# Patient Record
Sex: MALE | Race: WHITE | URBAN - METROPOLITAN AREA
[De-identification: names, ages, dates, MRNs, and addresses within clinical notes are randomized per-mention and may not be internally consistent; named-entity substitution may affect disease eponyms.]

---

## 2024-11-01 ASSESSMENT — PATIENT HEALTH QUESTIONNAIRE - PHQ9
SUM OF ALL RESPONSES TO PHQ QUESTIONS 1-9: 0
2. FEELING DOWN, DEPRESSED OR HOPELESS: NOT AT ALL
SUM OF ALL RESPONSES TO PHQ QUESTIONS 1-9: 0
SUM OF ALL RESPONSES TO PHQ QUESTIONS 1-9: 0
1. LITTLE INTEREST OR PLEASURE IN DOING THINGS: NOT AT ALL
SUM OF ALL RESPONSES TO PHQ9 QUESTIONS 1 & 2: 0
2. FEELING DOWN, DEPRESSED OR HOPELESS: NOT AT ALL
1. LITTLE INTEREST OR PLEASURE IN DOING THINGS: NOT AT ALL
SUM OF ALL RESPONSES TO PHQ QUESTIONS 1-9: 0
SUM OF ALL RESPONSES TO PHQ9 QUESTIONS 1 & 2: 0

## 2024-11-04 ENCOUNTER — OFFICE VISIT (OUTPATIENT)
Dept: FAMILY MEDICINE CLINIC | Age: 29
End: 2024-11-04

## 2024-11-04 VITALS
RESPIRATION RATE: 18 BRPM | OXYGEN SATURATION: 96 % | HEART RATE: 69 BPM | WEIGHT: 193.6 LBS | HEIGHT: 72 IN | DIASTOLIC BLOOD PRESSURE: 64 MMHG | TEMPERATURE: 97.9 F | SYSTOLIC BLOOD PRESSURE: 106 MMHG | BODY MASS INDEX: 26.22 KG/M2

## 2024-11-04 DIAGNOSIS — M25.562 ACUTE PAIN OF LEFT KNEE: ICD-10-CM

## 2024-11-04 DIAGNOSIS — Z31.69 SUBFERTILITY OF COUPLE: ICD-10-CM

## 2024-11-04 DIAGNOSIS — Z00.00 WELL ADULT HEALTH CHECK: Primary | ICD-10-CM

## 2024-11-04 SDOH — ECONOMIC STABILITY: FOOD INSECURITY: WITHIN THE PAST 12 MONTHS, YOU WORRIED THAT YOUR FOOD WOULD RUN OUT BEFORE YOU GOT MONEY TO BUY MORE.: NEVER TRUE

## 2024-11-04 SDOH — ECONOMIC STABILITY: FOOD INSECURITY: WITHIN THE PAST 12 MONTHS, THE FOOD YOU BOUGHT JUST DIDN'T LAST AND YOU DIDN'T HAVE MONEY TO GET MORE.: NEVER TRUE

## 2024-11-04 SDOH — ECONOMIC STABILITY: INCOME INSECURITY: HOW HARD IS IT FOR YOU TO PAY FOR THE VERY BASICS LIKE FOOD, HOUSING, MEDICAL CARE, AND HEATING?: NOT HARD AT ALL

## 2024-11-04 NOTE — PROGRESS NOTES
11/4/2024    This is a 28 y.o. male   Chief Complaint   Patient presents with    New Patient     New fertility patient     HPI    Works construction  Originally from [place]    His wife is seeing Madisyn Golden NP for fertility  - Being some for treatment for endometriosis and hormone abnormalities    Trying to conceive for over 2 years    Lifestyle  - enjoys being active, sports, hiking  - high protein diet. No fast food  -No prior  surgery or trauma  -No prior fertility workup    Left knee pain  -This been going on for 5 to 6 months.  It [place].  There was a specific inciting event.  He continues to have left knee pain that is medial.  It is more notable after being active such as running.  He notes sensation of looseness or laxity in the area.    Review of Systems     No current outpatient medications on file.     No current facility-administered medications for this visit.       /64 (Site: Right Upper Arm, Position: Sitting, Cuff Size: Small Adult)   Pulse 69   Temp 97.9 °F (36.6 °C) (Oral)   Resp 18   Ht 1.829 m (6')   Wt 87.8 kg (193 lb 9.6 oz)   SpO2 96%   BMI 26.26 kg/m²     Physical Exam  Constitutional:       Appearance: He is well-developed.   HENT:      Head: Normocephalic and atraumatic.   Cardiovascular:      Rate and Rhythm: Normal rate and regular rhythm.      Heart sounds: Normal heart sounds.   Pulmonary:      Effort: Pulmonary effort is normal.      Breath sounds: Normal breath sounds.   Musculoskeletal:      Cervical back: Normal range of motion.      Comments: Left knee  Minimal medial line tenderness  Possible joint laxity medial space.  Negative anterior drawer test.  Equivocal Melida's test.  Normal strength   Skin:     General: Skin is warm and dry.      Findings: No rash.   Neurological:      Mental Status: He is alert and oriented to person, place, and time.      Deep Tendon Reflexes: Reflexes are normal and symmetric.         Wt Readings

## 2024-11-04 NOTE — PATIENT INSTRUCTIONS
At home  - [facility]  - Fellow      SEMINAL FLUID COLLECTION INSTRUCTIONS    1) First obtain seminal fluid collection kit using your prescription    2) Avoid sexual intercourse for at least 3 days prior to collection of specimen    3) Label plastic container with full name and collection date and time    4) Using the seminal fluid collection device provided, use a small sterile needle (available at pharmacies) to make a hole in the condom. Collect the specimen during a natural act of genital intercourse. Be sure to leave extra space at the end to allow for collection of semen    5) Empty the contents of the device as completely as possible into the plastic container. Do not touch any of the seminal fluid itself as it may contaminate the specimen and alter the results.    6) Discard the empty SFC device. Do not include it with the specimen    7) Place the specimen container and lab requisition form in the sack provided, making sure it contains patient's name, doctor, collection date, and time. Keep at room temp or as close as possible to body temp.    8) Deliver directly to the lab within 30 minutes. If this is not possible, deliver as soon as possible, for time is very critical to the analysis of the specimen.    9) Inform the laboratory  that this specimen is for a \"seminal fluid analysis\" so that he/she will promptly take care of the specimen.    The following labs can accept the specimen. Other labs may be available, you can check with your insurance company regarding this. Please call the lab prior to collection to find out their procedure and hours for analysis.    Be sure the lab has our clinic information and fax number so that they can send us the results. Please call if we have not contacted you with results within 5 days of the specimen being submitted.      [facility]  Phone: [phone]  [place]  [address]    [place]  [address] [place] [address]

## 2024-11-05 DIAGNOSIS — Z31.69 SUBFERTILITY OF COUPLE: ICD-10-CM

## 2024-11-05 DIAGNOSIS — Z00.00 WELL ADULT HEALTH CHECK: ICD-10-CM

## 2024-11-05 LAB
FSH SERPL-ACNC: 2.9 MIU/ML
LH SERPL-ACNC: 3.7 MIU/ML
TSH SERPL DL<=0.005 MIU/L-ACNC: 2.16 UIU/ML (ref 0.27–4.2)

## 2024-11-07 LAB
INSULIN FREE SERPL-ACNC: 3 UIU/ML (ref 3–25)
INSULIN SERPL-ACNC: 3 UIU/ML (ref 3–25)
SHBG SERPL-SCNC: 64 NMOL/L (ref 10–60)
TESTOST FREE SERPL-MCNC: 152.8 PG/ML (ref 47–244)
TESTOST SERPL-MCNC: 997 NG/DL (ref 249–836)

## 2024-11-15 ENCOUNTER — OFFICE VISIT (OUTPATIENT)
Dept: ORTHOPEDIC SURGERY | Age: 29
End: 2024-11-15
Payer: COMMERCIAL

## 2024-11-15 VITALS — WEIGHT: 193 LBS | BODY MASS INDEX: 26.14 KG/M2 | HEIGHT: 72 IN | RESPIRATION RATE: 12 BRPM

## 2024-11-15 DIAGNOSIS — M25.562 ACUTE PAIN OF LEFT KNEE: Primary | ICD-10-CM

## 2024-11-15 DIAGNOSIS — M23.42 LOOSE BODY IN KNEE, LEFT KNEE: ICD-10-CM

## 2024-11-15 PROCEDURE — 99203 OFFICE O/P NEW LOW 30 MIN: CPT | Performed by: ORTHOPAEDIC SURGERY

## 2024-11-15 NOTE — PROGRESS NOTES
Wesyl Tovar is seen today for evaluation and treatment of left knee pain.  He was kindly referred by Dr. Adrian cary.    He reports knee pain has been going on for 5 or 6 months and when he was tackled awkwardly playing [place] rules football in July.  His knee was forced inward and he felt a pop.  Since then he has had difficulty with higher impact activities.  He feels something moving in his knee which catches.  He has a sense of instability and moderate pain after working out.  Rest pain is 2 out of 10.    He is otherwise healthy.  He recently moved to [place] from [place].  He works in construction.      History: Patient's relevant past family, medical, and social history are reviewed as part of today's visit. ROS of pertinent positives and negatives as above; otherwise negative.    General Exam:    Vitals: Resp. rate 12, height 1.829 m (6'), weight 87.5 kg (193 lb).    He is very muscular.  Constitutional: Patient is adequately groomed with no evidence of malnutrition  Mental Status: The patient is oriented to time, place and person.  The patient's mood and affect are appropriate.  Gait:  Patient walks with normal gait and station.  Lymphatic: The lymphatic examination bilaterally reveals all areas to be without enlargement or induration.  Vascular: Examination reveals no swelling or calf tenderness.  Peripheral pulses are palpable and 2+.  Neurological: The patient has good coordination.  There is no weakness or sensory deficit.    Skin:    Head/Neck: inspection reveals no rashes, ulcerations or lesions.  Trunk:  inspection reveals no rashes, ulcerations or lesions.  Right Lower Extremity: inspection reveals no rashes, ulcerations or lesions.  Left Lower Extremity: inspection reveals no rashes, ulcerations or lesions.    Examination of the bilateral hips reveals normal flexion and extension.  There is no restriction in rotation.  There is no tenderness to palpation anteriorly posteriorly or

## 2024-12-05 ENCOUNTER — TELEPHONE (OUTPATIENT)
Dept: FAMILY MEDICINE CLINIC | Age: 29
End: 2024-12-05

## 2024-12-05 DIAGNOSIS — R86.8 ABNORMAL SPERM MORPHOLOGY: Primary | ICD-10-CM

## 2024-12-05 DIAGNOSIS — R86.8 DECREASED SPERM MOTILITY: ICD-10-CM

## 2024-12-05 NOTE — TELEPHONE ENCOUNTER
Please inform Wesly that his sperm testing showed slightly low motility and slightly below average morphology.  Importantly, normal sperm count.  I would recommend he get labs ordered, best to get these in the morning for accuracy.  We can meet up afterward to discuss results.  Thank you

## 2024-12-06 ENCOUNTER — OFFICE VISIT (OUTPATIENT)
Dept: ORTHOPEDIC SURGERY | Age: 29
End: 2024-12-06
Payer: COMMERCIAL

## 2024-12-06 VITALS — WEIGHT: 193 LBS | HEIGHT: 72 IN | BODY MASS INDEX: 26.14 KG/M2

## 2024-12-06 DIAGNOSIS — M25.562 ACUTE PAIN OF LEFT KNEE: Primary | ICD-10-CM

## 2024-12-06 DIAGNOSIS — M94.262 CHONDROMALACIA OF LEFT KNEE: ICD-10-CM

## 2024-12-06 PROCEDURE — 99214 OFFICE O/P EST MOD 30 MIN: CPT | Performed by: ORTHOPAEDIC SURGERY

## 2024-12-06 NOTE — PROGRESS NOTES
Wesly Tovar returns today to follow-up his left knee MRI.  I was concerned that his x-ray images showed a potential intra-articular body.  He reports no significant pain today.    General Exam:    Vitals: Height 1.829 m (6'), weight 87.5 kg (193 lb).  Constitutional: Patient is adequately groomed with no evidence of malnutrition  Mental Status: The patient is oriented to time, place and person.  The patient's mood and affect are appropriate.    Left knee today has no crepitation.  He has mild medial joint line tenderness.  He is stable with no effusion.  Calf is soft.      MRI left knee 12/3/2024 is reviewed and demonstrates  FINDINGS: There is a small focal area of chondromalacia within the medial compartment involving the medial aspect of medial tibial plateau with an underlying subchondral cyst and mild underlying subchondral bone marrow reaction/edema (sagittal PD SPIR and T1 series 501 and 601, image 20).  Additionally, there is probable subtle osseous irregularity of the subchondral plate of the medial tibial plateau concerning for a subtle chronic healed medial tibial plateau fracture (sagittal T1 and PD SPIR series 601 and 501, images 18 and 19 and coronal PD SPIR series 401, image 11).   A chronic healed fracture/avulsion fracture of the superior aspect of the fibular head is present (sagittal oblique T2 series 701, images 3 through 5 and sagittal T1 series 601, image 2 through 4).   No chondromalacia is apparent within the lateral or patellofemoral compartments.  No patellar tilt or subluxation is present to suggest a patellar tracking abnormality.   Intrasubstance degeneration versus a small/subtle oblique undersurface tear of the posterior horn of the medial meniscus is present (sagittal PD SPIR and T1 series 501 and 601, images 19 and 20).   There is no evidence of lateral meniscal tear.   The ACL, PCL, medial and lateral collateral ligaments and extensor mechanism are intact.   A trace knee effusion

## 2024-12-06 NOTE — TELEPHONE ENCOUNTER
Pt called back I gave him results he will go to the lab in [facility] to get this done.

## 2024-12-09 DIAGNOSIS — R86.8 DECREASED SPERM MOTILITY: ICD-10-CM

## 2024-12-09 DIAGNOSIS — R86.8 ABNORMAL SPERM MORPHOLOGY: ICD-10-CM

## 2024-12-09 LAB
FSH SERPL-ACNC: 4 MIU/ML
LH SERPL-ACNC: 4.4 MIU/ML
PROLACTIN SERPL IA-MCNC: 11 NG/ML
TSH SERPL DL<=0.005 MIU/L-ACNC: 2.47 UIU/ML (ref 0.27–4.2)

## 2024-12-12 LAB
SHBG SERPL-SCNC: 46 NMOL/L (ref 10–60)
TESTOST FREE SERPL-MCNC: 132 PG/ML (ref 47–244)
TESTOST SERPL-MCNC: 726 NG/DL (ref 249–836)